# Patient Record
Sex: MALE | Employment: UNEMPLOYED | URBAN - METROPOLITAN AREA
[De-identification: names, ages, dates, MRNs, and addresses within clinical notes are randomized per-mention and may not be internally consistent; named-entity substitution may affect disease eponyms.]

---

## 2017-09-01 ENCOUNTER — OFFICE VISIT (OUTPATIENT)
Dept: URGENT CARE | Facility: URGENT CARE | Age: 22
End: 2017-09-01
Payer: COMMERCIAL

## 2017-09-01 VITALS
OXYGEN SATURATION: 100 % | DIASTOLIC BLOOD PRESSURE: 80 MMHG | HEART RATE: 79 BPM | TEMPERATURE: 98.1 F | SYSTOLIC BLOOD PRESSURE: 106 MMHG | BODY MASS INDEX: 24.81 KG/M2 | WEIGHT: 162 LBS

## 2017-09-01 DIAGNOSIS — H10.33 ACUTE BACTERIAL CONJUNCTIVITIS OF BOTH EYES: Primary | ICD-10-CM

## 2017-09-01 PROCEDURE — 99213 OFFICE O/P EST LOW 20 MIN: CPT | Performed by: FAMILY MEDICINE

## 2017-09-01 RX ORDER — POLYMYXIN B SULFATE AND TRIMETHOPRIM 1; 10000 MG/ML; [USP'U]/ML
1 SOLUTION OPHTHALMIC EVERY 4 HOURS
Qty: 10 ML | Refills: 0 | Status: SHIPPED | OUTPATIENT
Start: 2017-09-01 | End: 2017-09-07

## 2017-09-01 NOTE — NURSING NOTE
"Chief Complaint   Patient presents with     Eye Problem     itchy eyes with some discharge today       Initial /80 (BP Location: Right arm, Patient Position: Chair, Cuff Size: Adult Regular)  Pulse 79  Temp 98.1  F (36.7  C) (Oral)  Wt 162 lb (73.5 kg)  SpO2 100%  BMI 24.81 kg/m2 Estimated body mass index is 24.81 kg/(m^2) as calculated from the following:    Height as of 4/23/16: 5' 7.75\" (1.721 m).    Weight as of this encounter: 162 lb (73.5 kg).  Medication Reconciliation: complete Sonu TENORIO    "

## 2017-09-01 NOTE — MR AVS SNAPSHOT
After Visit Summary   9/1/2017    Sid Hahn    MRN: [id number]           Patient Information     Date Of Birth          1995        Visit Information        Provider Department      9/1/2017 12:30 PM Deepa Moeller MD [facility]        Today's Diagnoses     Acute bacterial conjunctivitis of both eyes    -  1      Care Instructions      What Is Conjunctivitis?    Conjunctivitis is an irritation or infection. It affects the membrane that covers the white of your eye and the inside of your eyelid (conjunctiva). It can happen to one or both eyes. The membrane swells and the blood vessels enlarge (dilate). This makes your eye red. That's why conjunctivitis is sometimes called red eye or pink eye.  What are the symptoms?  If you have one or more of these symptoms, see an eye doctor:    Redness in and around your eye    Eyes that are puffy and sore    Itching, burning, or stinging eyes    Watery eyes or discharge from your eye    Eyelids that are crusty or stuck together when you wake up in the morning    Pink color in the whites of one or both eyes  Getting treatment quickly can help prevent damage to your eyes.  How is it diagnosed?  Conjunctivitis is usually a minor eye infection. But it can sometimes become a more serious problem. Some more serious eye diseases have symptoms that look like conjunctivitis. So it's important for an eye doctor to diagnose you. Your eye doctor will ask about your symptoms and any medicines you take. He or she will ask about any illnesses or medical conditions you may have. The doctor will also check your eyes with a hand-held light and a special microscope called a slit lamp.  Date Last Reviewed: 6/11/2015 2000-2017 [facility]. [address] [place] [address]. All rights reserved. This information is not intended as a substitute for professional medical care. Always follow your healthcare  "professional's instructions.                Follow-ups after your visit        Who to contact     If you have questions or need follow up information about today's clinic visit or your schedule please contact [facility] directly at [phone].  Normal or non-critical lab and imaging results will be communicated to you by Fact[employer], letter or phone within 4 business days after the clinic has received the results. If you do not hear from us within 7 days, please contact the clinic through Factabase[employer] or phone. If you have a critical or abnormal lab result, we will notify you by phone as soon as possible.  Submit refill requests through [employer] or call your pharmacy and they will forward the refill request to us. Please allow 3 business days for your refill to be completed.          Additional Information About Your Visit        [employer] Information     [employer] lets you send messages to your doctor, view your test results, renew your prescriptions, schedule appointments and more. To sign up, go to [email or web address]/[employer] . Click on \"Log in\" on the left side of the screen, which will take you to the Welcome page. Then click on \"Sign up Now\" on the right side of the page.     You will be asked to enter the access code listed below, as well as some personal information. Please follow the directions to create your username and password.     Your access code is: [email or web address]  Expires: 2017 12:55 PM     Your access code will  in 90 days. If you need help or a new code, please call your [place] clinic or [phone].        Care EveryWhere ID     This is your Care EveryWhere ID. This could be used by other organizations to access your [place] medical records  [id number]        Your Vitals Were     Pulse Temperature Pulse Oximetry BMI (Body Mass Index)          79 98.1  F (36.7  C) (Oral) 100% 24.81 kg/m2         Blood Pressure from Last 3 Encounters:   17 106/80   16 120/90 "   12/11/14 122/66    Weight from Last 3 Encounters:   09/01/17 162 lb (73.5 kg)   04/23/16 161 lb (73 kg)   12/11/14 163 lb 6.4 oz (74.1 kg) (66 %)*     * Growth percentiles are based on [facility] 2-20 Years data.              Today, you had the following     No orders found for display         Today's Medication Changes          These changes are accurate as of: 9/1/17 12:55 PM.  If you have any questions, ask your nurse or doctor.               Start taking these medicines.        Dose/Directions    trimethoprim-polymyxin b ophthalmic solution   Commonly known as:  POLYTRIM   Used for:  Acute bacterial conjunctivitis of both eyes   Started by:  Deepa Moeller MD        Dose:  1 drop   Apply 1 drop to eye every 4 hours for 6 days   Quantity:  10 mL   Refills:  0            Where to get your medicines      These medications were sent to [facility] [address] [place] [address] [address] AT [place] [address] [address] [facility] [address]    Hours:  24-hours Phone:  [phone]     trimethoprim-polymyxin b ophthalmic solution                Primary Care Provider Office Phone # Fax #    Melissa Lara [phone]       [address] [address]        Equal Access to Services     DAVID STARKS AH: Hadii aad ku hadasho Soomaali, waaxda luqadaha, qaybta kaalmada adeegyada, mary krueger. So [facility] [phone].    ATENCIÓN: Si habla español, tiene a morley disposición servicios gratuitos de asistencia lingüística. Llame al [phone].    We comply with applicable federal civil rights laws and [place] laws. We do not discriminate on the basis of race, color, national origin, age, disability sex, sexual orientation or gender identity.            Thank you!     Thank you for choosing [facility]  for your care. Our goal is always to provide you with excellent care. Hearing back from our patients is one way  we can continue to improve our services. Please take a few minutes to complete the written survey that you may receive in the mail after your visit with us. Thank you!             Your Updated Medication List - Protect others around you: Learn how to safely use, store and throw away your medicines at www.disposemymeds.org.          This list is accurate as of: 9/1/17 12:55 PM.  Always use your most recent med list.                   Brand Name Dispense Instructions for use Diagnosis    trimethoprim-polymyxin b ophthalmic solution    POLYTRIM    10 mL    Apply 1 drop to eye every 4 hours for 6 days    Acute bacterial conjunctivitis of both eyes

## 2017-09-01 NOTE — LETTER
[facility]  [address] [id number] [phone]      September 1, 2017    RE:  Sid Hahn                                                                                                                                                       [address] [place] [id number]            To whom it may concern:    Sid Hahn is under my professional care for Acute bacterial conjunctivitis of both eyes.       Sincerely,        Deepa Moeller    [facility]

## 2017-09-01 NOTE — PATIENT INSTRUCTIONS
What Is Conjunctivitis?    Conjunctivitis is an irritation or infection. It affects the membrane that covers the white of your eye and the inside of your eyelid (conjunctiva). It can happen to one or both eyes. The membrane swells and the blood vessels enlarge (dilate). This makes your eye red. That's why conjunctivitis is sometimes called red eye or pink eye.  What are the symptoms?  If you have one or more of these symptoms, see an eye doctor:    Redness in and around your eye    Eyes that are puffy and sore    Itching, burning, or stinging eyes    Watery eyes or discharge from your eye    Eyelids that are crusty or stuck together when you wake up in the morning    Pink color in the whites of one or both eyes  Getting treatment quickly can help prevent damage to your eyes.  How is it diagnosed?  Conjunctivitis is usually a minor eye infection. But it can sometimes become a more serious problem. Some more serious eye diseases have symptoms that look like conjunctivitis. So it's important for an eye doctor to diagnose you. Your eye doctor will ask about your symptoms and any medicines you take. He or she will ask about any illnesses or medical conditions you may have. The doctor will also check your eyes with a hand-held light and a special microscope called a slit lamp.  Date Last Reviewed: 6/11/2015 2000-2017 [employer]. [address] [place] [address], [place] [address]. All rights reserved. This information is not intended as a substitute for professional medical care. Always follow your healthcare professional's instructions.

## 2017-12-30 ENCOUNTER — APPOINTMENT (OUTPATIENT)
Dept: GENERAL RADIOLOGY | Facility: CLINIC | Age: 22
End: 2017-12-30
Attending: EMERGENCY MEDICINE
Payer: COMMERCIAL

## 2017-12-30 ENCOUNTER — HOSPITAL ENCOUNTER (EMERGENCY)
Facility: CLINIC | Age: 22
Discharge: HOME | End: 2017-12-30
Attending: EMERGENCY MEDICINE | Admitting: EMERGENCY MEDICINE
Payer: COMMERCIAL

## 2017-12-30 VITALS
SYSTOLIC BLOOD PRESSURE: 127 MMHG | OXYGEN SATURATION: 98 % | BODY MASS INDEX: 24.25 KG/M2 | TEMPERATURE: 99.5 F | DIASTOLIC BLOOD PRESSURE: 64 MMHG | HEIGHT: 68 IN | HEART RATE: 110 BPM | WEIGHT: 160 LBS | RESPIRATION RATE: 18 BRPM

## 2017-12-30 DIAGNOSIS — J10.1 INFLUENZA A: ICD-10-CM

## 2017-12-30 LAB
FLUAV+FLUBV AG SPEC QL: NEGATIVE
FLUAV+FLUBV AG SPEC QL: POSITIVE
SPECIMEN SOURCE: ABNORMAL

## 2017-12-30 PROCEDURE — 71020 XR CHEST 2 VW: CPT

## 2017-12-30 PROCEDURE — 25000132 ZZH RX MED GY IP 250 OP 250 PS 637: Performed by: EMERGENCY MEDICINE

## 2017-12-30 PROCEDURE — 87804 INFLUENZA ASSAY W/OPTIC: CPT | Performed by: EMERGENCY MEDICINE

## 2017-12-30 PROCEDURE — 99284 EMERGENCY DEPT VISIT MOD MDM: CPT | Mod: 25

## 2017-12-30 RX ORDER — ONDANSETRON 4 MG/1
4 TABLET, ORALLY DISINTEGRATING ORAL EVERY 8 HOURS PRN
Qty: 10 TABLET | Refills: 0 | Status: SHIPPED | OUTPATIENT
Start: 2017-12-30 | End: 2018-01-02

## 2017-12-30 RX ORDER — OSELTAMIVIR PHOSPHATE 75 MG/1
75 CAPSULE ORAL ONCE
Status: COMPLETED | OUTPATIENT
Start: 2017-12-30 | End: 2017-12-30

## 2017-12-30 RX ORDER — IBUPROFEN 600 MG/1
600 TABLET, FILM COATED ORAL EVERY 6 HOURS PRN
Qty: 60 TABLET | Refills: 0 | Status: SHIPPED | OUTPATIENT
Start: 2017-12-30

## 2017-12-30 RX ORDER — OSELTAMIVIR PHOSPHATE 75 MG/1
75 CAPSULE ORAL 2 TIMES DAILY
Qty: 10 CAPSULE | Refills: 0 | Status: SHIPPED | OUTPATIENT
Start: 2017-12-30 | End: 2018-01-04

## 2017-12-30 RX ORDER — IBUPROFEN 600 MG/1
600 TABLET, FILM COATED ORAL ONCE
Status: COMPLETED | OUTPATIENT
Start: 2017-12-30 | End: 2017-12-30

## 2017-12-30 RX ADMIN — IBUPROFEN 600 MG: 600 TABLET ORAL at 22:56

## 2017-12-30 RX ADMIN — OSELTAMIVIR PHOSPHATE 75 MG: 75 CAPSULE ORAL at 23:26

## 2017-12-30 ASSESSMENT — ENCOUNTER SYMPTOMS
HEMATURIA: 0
RHINORRHEA: 1
FEVER: 1
MYALGIAS: 0
COUGH: 1
HEADACHES: 1
DYSURIA: 0
FREQUENCY: 0

## 2017-12-30 NOTE — ED AVS SNAPSHOT
Emergency Department    [address] [facility] [address]    Phone:  [phone]    Fax:  [phone]                                       Sid Hahn   MRN: [id number]    Department:   Emergency Department   Date of Visit:  12/30/2017           After Visit Summary Signature Page     I have received my discharge instructions, and my questions have been answered. I have discussed any challenges I see with this plan with the nurse or doctor.    ..........................................................................................................................................  Patient/Patient Representative Signature      ..........................................................................................................................................  Patient Representative Print Name and Relationship to Patient    ..................................................               ................................................  Date                                            Time    ..........................................................................................................................................  Reviewed by Signature/Title    ...................................................              ..............................................  Date                                                            Time

## 2017-12-30 NOTE — ED AVS SNAPSHOT
Emergency Department    [address] [facility] [address]    Phone:  [phone]    Fax:  [phone]                                       Sid Hahn   MRN: [id number]    Department:   Emergency Department   Date of Visit:  12/30/2017           Patient Information     Date Of Birth          1995        Your diagnoses for this visit were:     Influenza A        You were seen by Bhavana Moran MD.      Follow-up Information     Follow up with Melissa Lara MD. Schedule an appointment as soon as possible for a visit in 1 week.    Specialty:  Pediatrics    Why:  As needed    Contact information:    [address] [facility] [id number] [phone]          Discharge Instructions       *No school or work until you have been without a fever for 24 hours without tylenol or motrin.  *Take medications as prescribed.  Ibuprofen and/or tylenol for pain.  Zofran for nausea. Tamiflu. Continue your current medications  *Follow-up with your doctor for a recheck in 2-3 days.    *Return if you develop difficulty breathing, neck stiffness, confusion status changes are unable to keep fluids down, faint or feel like you will faint or become worse in any way.    Discharge Instructions  Influenza    You were diagnosed today with influenza or influenza like illness.  Influenza is a respiratory illness caused by influenza A or B viruses.  Influenza causes fever, headache, muscle aches, and fatigue.  These symptoms start one to four days after you have been around a person with this illness.  People with influenza commonly have a dry cough, sore throat, and a runny nose.   Influenza is spread through sneezing and coughing and can live on surfaces for several days.  It is usually contagious for 5 days but in some cases up to 10 days and often affects several family members. If you have a family member who is less than 2 years old, older than 65 years old, pregnant or has a serious medical  condition, they should be seen right away by a physician to decide if they should take preventative medications.      Return to the Emergency Department if:    You have trouble breathing    You develop pain in your chest    You have signs of being dehydrated, such as dizziness or unable to urinate at least three times daily     You feel confused    You cannot stop vomiting or you cannot drink enough fluids    In children, you should seek help if the child has any of the above or if child:    Has blue or purplish skin color    Is so irritable that he or she does not want to be held    Does not have tears when crying (in infants) or does not urinate at least three times daily    Does not wake up easily    Follow-up with your doctor if you are not improving after 5 days    What can I do to help myself?    Rest    Fluids -- Drink hydrating solutions such as Gatorade or pedialyte as often as you can. If you are drinking enough, you should pass urine at least every eight hours.    Acetaminophen and Ibuprofen (such as Tylenol  or Advil) can relieve fever, headache, and muscle aches. Do not give Aspirin to children under 18 years old.     Antiviral treatment -- Antiviral medicines do not make the flu symptoms go away immediately.  They have only been shown to make the symptoms go away 12 to 24 hours sooner than they would without treatment.       Antibiotics -- Antibiotics are NOT useful for treating viral illnesses such as influenza. Antibiotics should only used if there is a bacterial complication of the flu such as bacterial pneumonia, ear infection, or sinusitis.    Because you were diagnosed with a flu like illness you are very contagious.  This means you cannot work, attend school or  for at least 24 hours or until you no longer have a fever.    Remember that you can always come back to the Emergency Department if you are not able to see your regular doctor in the amount of time listed above, if you get any new  symptoms, or if there is anything that worries you.        24 Hour Appointment Hotline       To make an appointment at any [facility], call [phone] ([phone]). If you don't have a family doctor or clinic, we will help you find one. [place] clinics are conveniently located to serve the needs of you and your family.             Review of your medicines      START taking        Dose / Directions Last dose taken    ibuprofen 600 MG tablet   Commonly known as:  ADVIL/MOTRIN   Dose:  600 mg   Quantity:  60 tablet        Take 1 tablet (600 mg) by mouth every 6 hours as needed for moderate pain   Refills:  0        ondansetron 4 MG ODT tab   Commonly known as:  ZOFRAN ODT   Dose:  4 mg   Quantity:  10 tablet        Take 1 tablet (4 mg) by mouth every 8 hours as needed for nausea   Refills:  0        oseltamivir 75 MG capsule   Commonly known as:  TAMIFLU   Dose:  75 mg   Quantity:  10 capsule        Take 1 capsule (75 mg) by mouth 2 times daily for 5 days   Refills:  0                Prescriptions were sent or printed at these locations (3 Prescriptions)                   Other Prescriptions                Printed at Department/Unit printer (3 of 3)         oseltamivir (TAMIFLU) 75 MG capsule               ibuprofen (ADVIL/MOTRIN) 600 MG tablet               ondansetron (ZOFRAN ODT) 4 MG ODT tab                Procedures and tests performed during your visit     Chest XR,  PA & LAT    Influenza A/B antigen      Orders Needing Specimen Collection     None      Pending Results     Date and Time Order Name Status Description    12/30/2017 2249 Chest XR,  PA & LAT Preliminary             Pending Culture Results     No orders found from 12/28/2017 to 12/31/2017.            Pending Results Instructions     If you had any lab results that were not finalized at the time of your Discharge, you can call the ED Lab Result RN at [phone]. You will be contacted by this team for any positive Lab results or changes  in treatment. The nurses are available 7 days a week from 10A to 6:30P.  You can leave a message 24 hours per day and they will return your call.        Test Results From Your Hospital Stay        12/30/2017 10:58 PM      Component Results     Component Value Ref Range & Units Status    Influenza A/B Agn Specimen Nasal  Final    Influenza A Positive (A) NEG^Negative Final    Influenza B Negative NEG^Negative Final    Test results must be correlated with clinical data. If necessary, results   should be confirmed by a molecular assay or viral culture.           12/30/2017 11:03 PM      Narrative     XR CHEST 2 VW  12/30/2017 10:55 PM      HISTORY: Fever, cough.      COMPARISON: None.    FINDINGS: The heart size is normal. The lungs are clear. No  pneumothorax or pleural effusion.        Impression     IMPRESSION:  No acute abnormality.                Clinical Quality Measure: Blood Pressure Screening     Your blood pressure was checked while you were in the emergency department today. The last reading we obtained was  BP: 147/84 . Please read the guidelines below about what these numbers mean and what you should do about them.  If your systolic blood pressure (the top number) is less than 120 and your diastolic blood pressure (the bottom number) is less than 80, then your blood pressure is normal. There is nothing more that you need to do about it.  If your systolic blood pressure (the top number) is 120-139 or your diastolic blood pressure (the bottom number) is 80-89, your blood pressure may be higher than it should be. You should have your blood pressure rechecked within a year by a primary care provider.  If your systolic blood pressure (the top number) is 140 or greater or your diastolic blood pressure (the bottom number) is 90 or greater, you may have high blood pressure. High blood pressure is treatable, but if left untreated over time it can put you at risk for heart attack, stroke, or kidney failure. You  "should have your blood pressure rechecked by a primary care provider within the next 4 weeks.  If your provider in the emergency department today gave you specific instructions to follow-up with your doctor or provider even sooner than that, you should follow that instruction and not wait for up to 4 weeks for your follow-up visit.        Thank you for choosing [facility]       Thank you for choosing [facility] for your care. Our goal is always to provide you with excellent care. Hearing back from our patients is one way we can continue to improve our services. Please take a few minutes to complete the written survey that you may receive in the mail after you visit with us. Thank you!        [employer] Information     [facility] lets you send messages to your doctor, view your test results, renew your prescriptions, schedule appointments and more. To sign up, go to [email or web address] . Click on \"Log in\" on the left side of the screen, which will take you to the Welcome page. Then click on \"Sign up Now\" on the right side of the page.     You will be asked to enter the access code listed below, as well as some personal information. Please follow the directions to create your username and password.     Your access code is: [email or web address]  Expires: 3/30/2018 11:22 PM     Your access code will  in 90 days. If you need help or a new code, please call your [facility] clinic or [phone].        Care EveryWhere ID     This is your Care EveryWhere ID. This could be used by other organizations to access your [facility] medical records  [id number]        Equal Access to Services     [facility] TEREZA : Hadii king harper hadasho Soscarlettali, waaxda luqadaha, qaybta kaalmada adearturoyada, mary krueger. So [facility] [phone].    ATENCIÓN: Si habla español, tiene a morley disposición servicios gratuitos de asistencia lingüística. Llame al [phone].    We comply with applicable federal civil rights laws and [place] " laws. We do not discriminate on the basis of race, color, national origin, age, disability, sex, sexual orientation, or gender identity.            After Visit Summary       This is your record. Keep this with you and show to your community pharmacist(s) and doctor(s) at your next visit.

## 2017-12-30 NOTE — LETTER
December 30, 2017      To Whom It May Concern:      Sid Hahn was seen in our Emergency Department today, 12/30/17.  He was diagnosed with Influenza A. Please excuse him from work until he has been without a fever for 24 hours off of medications.    Sincerely,        Bhavana Moran MD

## 2017-12-31 NOTE — DISCHARGE INSTRUCTIONS
*No school or work until you have been without a fever for 24 hours without tylenol or motrin.  *Take medications as prescribed.  Ibuprofen and/or tylenol for pain.  Zofran for nausea. Tamiflu. Continue your current medications  *Follow-up with your doctor for a recheck in 2-3 days.    *Return if you develop difficulty breathing, neck stiffness, confusion status changes are unable to keep fluids down, faint or feel like you will faint or become worse in any way.    Discharge Instructions  Influenza    You were diagnosed today with influenza or influenza like illness.  Influenza is a respiratory illness caused by influenza A or B viruses.  Influenza causes fever, headache, muscle aches, and fatigue.  These symptoms start one to four days after you have been around a person with this illness.  People with influenza commonly have a dry cough, sore throat, and a runny nose.   Influenza is spread through sneezing and coughing and can live on surfaces for several days.  It is usually contagious for 5 days but in some cases up to 10 days and often affects several family members. If you have a family member who is less than 2 years old, older than 65 years old, pregnant or has a serious medical condition, they should be seen right away by a physician to decide if they should take preventative medications.      Return to the Emergency Department if:    You have trouble breathing    You develop pain in your chest    You have signs of being dehydrated, such as dizziness or unable to urinate at least three times daily     You feel confused    You cannot stop vomiting or you cannot drink enough fluids    In children, you should seek help if the child has any of the above or if child:    Has blue or purplish skin color    Is so irritable that he or she does not want to be held    Does not have tears when crying (in infants) or does not urinate at least three times daily    Does not wake up easily    Follow-up with your doctor if  you are not improving after 5 days    What can I do to help myself?    Rest    Fluids -- Drink hydrating solutions such as Gatorade or pedialyte as often as you can. If you are drinking enough, you should pass urine at least every eight hours.    Acetaminophen and Ibuprofen (such as Tylenol  or Advil) can relieve fever, headache, and muscle aches. Do not give Aspirin to children under 18 years old.     Antiviral treatment -- Antiviral medicines do not make the flu symptoms go away immediately.  They have only been shown to make the symptoms go away 12 to 24 hours sooner than they would without treatment.       Antibiotics -- Antibiotics are NOT useful for treating viral illnesses such as influenza. Antibiotics should only used if there is a bacterial complication of the flu such as bacterial pneumonia, ear infection, or sinusitis.    Because you were diagnosed with a flu like illness you are very contagious.  This means you cannot work, attend school or  for at least 24 hours or until you no longer have a fever.    Remember that you can always come back to the Emergency Department if you are not able to see your regular doctor in the amount of time listed above, if you get any new symptoms, or if there is anything that worries you.

## 2017-12-31 NOTE — ED PROVIDER NOTES
"  History     Chief Complaint:  Fever    HPI   Sid Hahn is a 22 year old male who presents with a fever. The patient reports that he has experienced intermittent fevers for the past two weeks with associated cough. He reports that he ahd been feeling somewhat better but then again worsened today. He has had a headache and chest pain related to this cough. He reports that he experienced some diarrhea last week, however he has not had this recently. 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 He has been taking ibuprofen for his fever but also reports that he has not recorded temperatures during this time. He denies having had any rashes, body aches, or urinary symptoms.     Allergies:  Minocycline  Penicillins    Medications:    The patient is not currently taking any prescribed medications.     Past Medical History:    Acne  Cannabis abuse, continuous  Dermatophytosis  Elevated LFT's  Epistaxis  Moderate major depression  Onychomycosis  Cannabis abuse    Past Surgical History:    History reviewed. No pertinent past surgical history.     Family History:    The patient denies any relevant family medical history.     Social History:  Smoking Status: Passive exposure  Smokeless Tobacco: No  Alcohol Use: No   Marital Status:  Single [1]    Review of Systems   Constitutional: Positive for fever.   HENT: Positive for rhinorrhea.    Respiratory: Positive for cough.    Genitourinary: Negative for dysuria, frequency, hematuria and urgency.   Musculoskeletal: Negative for myalgias.   Skin: Negative for rash.   Neurological: Positive for headaches.     Physical Exam   Vitals:  Patient Vitals for the past 24 hrs:   BP Temp Temp src Pulse Resp SpO2 Height Weight   12/30/17 2249 - 99.5  F (37.5  C) Oral - - - - -   12/30/17 2248 - - - - - 100 % - -   12/30/17 2211 147/84 98.9  F (37.2  C) - 120 20 99 % 1.727 m (5' 8\") 72.6 kg (160 lb)        Physical Exam  General: Well-nourished, " appears to be uncomfortable when I enter the room,warm to touch  Eyes: PERRL, conjunctivae pink no scleral icterus or conjunctival injection  ENT:  Moist mucus membranes, posterior oropharynx mildly erythematous without exudates or edema. TM clear bilaterally  Respiratory:  Lungs clear to auscultation bilaterally, no crackles/rubs/wheezes.  Good air movement.  Normal work of breathing.  +Cough  CV: Normal rate and rhythm, no murmurs/rubs/gallops  GI:  Abdomen soft and non-distended.  Normoactive BS.  No tenderness, guarding or rebound  Skin: Warm, dry.  No rashes or petechiae  Musculoskeletal: No peripheral edema or calf tenderness  Neuro: Alert and oriented to person/place/time.  Neck supple.  Psychiatric: Normal affect    Emergency Department Course     Imaging:  Radiology findings were communicated with the patient who voiced understanding of the findings.  Chest XR, PA & LAT:  IMPRESSION:  No acute abnormality.  Reading per radiology.     Laboratory:  Laboratory findings were communicated with the patient who voiced understanding of the findings.  Influenza A/B antigen: Influenza A: positive    Interventions:  2256 Advil 600 mg oral    Emergency Department Course:  Nursing notes and vitals reviewed.  I performed an exam of the patient as documented above.   The patient was sent for a XR while in the emergency department, results above.      2306 I rechecked with the patient    I discussed the treatment plan with the patient. They expressed understanding of this plan and consented to discharge. They will be discharged home with instructions for care and follow up. In addition, the patient will return to the emergency department if their symptoms persist, worsen, if new symptoms arise or if there is any concern.  All questions were answered.     I personally reviewed the laboratory results with the patient and answered all related questions prior to discharge.    Impression & Plan      Medical Decision  Making:  Sid Hahn is a 22 year old male who presents for evaluation of cough and fever. This is consistent with an influenza.  The patient is not out of treatment window for influenza and medications ordered as noted above. Chest x ray demonstrates no acute abnormalities. He is at risk for pneumonia but no signs of this are detected on today's visit.  He is not dehydrated and work of breathing is normal. There is no signs of serious bacterial infection such as bacteremia, meningitis, UTI/pyelonephritis, strep pharyngitis, etc.   Close followup of primary care physician is indicated and return to the ED for high fevers > 103 for more than 48 hours more, increasing productive cough, shortness of breath, or confusion.      Diagnosis:    ICD-10-CM    1. Influenza A J10.1       Disposition:   Discharged    CMS Diagnoses: None     Discharge medications:   Sid Hahn   Home Medication Instructions ANGE:[id number]    Printed on:12/30/17 5208   Medication Information                      ibuprofen (ADVIL/MOTRIN) 600 MG tablet  Take 1 tablet (600 mg) by mouth every 6 hours as needed for moderate pain             ondansetron (ZOFRAN ODT) 4 MG ODT tab  Take 1 tablet (4 mg) by mouth every 8 hours as needed for nausea             oseltamivir (TAMIFLU) 75 MG capsule  Take 1 capsule (75 mg) by mouth 2 times daily for 5 days                  Scribe Disclosure:  Ruben BEAULIEU, am serving as a scribe at 10:45 PM on 12/30/2017 to document services personally performed by Bhavana Moran MD, based on my observations and the provider's statements to me.    EMERGENCY DEPARTMENT       Bhavana Moran MD  12/30/17 0510

## 2019-06-27 ENCOUNTER — OFFICE VISIT (OUTPATIENT)
Dept: INTERNAL MEDICINE | Facility: CLINIC | Age: 24
End: 2019-06-27
Payer: COMMERCIAL

## 2019-06-27 VITALS
SYSTOLIC BLOOD PRESSURE: 126 MMHG | OXYGEN SATURATION: 98 % | WEIGHT: 176.9 LBS | BODY MASS INDEX: 27.76 KG/M2 | TEMPERATURE: 98.5 F | RESPIRATION RATE: 16 BRPM | HEIGHT: 67 IN | HEART RATE: 79 BPM | DIASTOLIC BLOOD PRESSURE: 80 MMHG

## 2019-06-27 DIAGNOSIS — Z00.00 ROUTINE HISTORY AND PHYSICAL EXAMINATION OF ADULT: Primary | ICD-10-CM

## 2019-06-27 PROCEDURE — 99395 PREV VISIT EST AGE 18-39: CPT | Performed by: PHYSICIAN ASSISTANT

## 2019-06-27 SDOH — HEALTH STABILITY: MENTAL HEALTH: HOW OFTEN DO YOU HAVE A DRINK CONTAINING ALCOHOL?: MONTHLY OR LESS

## 2019-06-27 SDOH — HEALTH STABILITY: MENTAL HEALTH: HOW MANY STANDARD DRINKS CONTAINING ALCOHOL DO YOU HAVE ON A TYPICAL DAY?: 3 OR 4

## 2019-06-27 ASSESSMENT — MIFFLIN-ST. JEOR: SCORE: 1756.04

## 2019-06-27 NOTE — PROGRESS NOTES
SUBJECTIVE:   CC: Sid Hahn is an 23 year old male who presents for preventative health visit.     Healthy Habits:     Getting at least 3 servings of Calcium per day:  Yes    Bi-annual eye exam:  NO    Dental care twice a year:  Yes    Sleep apnea or symptoms of sleep apnea:  None    Diet:  Regular (no restrictions)    Frequency of exercise:  1 day/week    Duration of exercise:  15-30 minutes    Taking medications regularly:  Yes    Medication side effects:  None    PHQ-2 Total Score: 0    Additional concerns today:  No      Today's PHQ-2 Score:   PHQ-2 ( 1999 [employer]) 6/27/2019   Q1: Little interest or pleasure in doing things 0   Q2: Feeling down, depressed or hopeless 0   PHQ-2 Score 0   Q1: Little interest or pleasure in doing things Not at all   Q2: Feeling down, depressed or hopeless Not at all   PHQ-2 Score 0       Abuse: Current or Past(Physical, Sexual or Emotional)- No  Do you feel safe in your environment? Yes    Social History     Tobacco Use     Smoking status: Passive Smoke Exposure - Never Smoker     Smokeless tobacco: Never Used     Tobacco comment: [facility] house that he is at daily   Substance Use Topics     Alcohol use: Yes     Frequency: Monthly or less     Drinks per session: 3 or 4     Comment: 11/8/2012 kv     If you drink alcohol do you typically have >3 drinks per day or >7 drinks per week? No      Alcohol Use 6/27/2019   Prescreen: >3 drinks/day or >7 drinks/week? No   No flowsheet data found.    Last PSA: No results found for: PSA    Reviewed orders with patient. Reviewed health maintenance and updated orders accordingly - Yes      Reviewed and updated as needed this visit by clinical staff  Tobacco  Allergies  Meds  Problems  Surg Hx  Fam Hx  Soc Hx        Reviewed and updated as needed this visit by Provider  Meds  Problems  Surg Hx  Fam Hx            Review of Systems  CONSTITUTIONAL: NEGATIVE for fever, chills, change in weight  INTEGUMENTARY/SKIN: NEGATIVE for  "worrisome rashes, moles or lesions  EYES: NEGATIVE for vision changes or irritation  ENT: NEGATIVE for ear, mouth and throat problems  RESP: NEGATIVE for significant cough or SOB  CV: NEGATIVE for chest pain, palpitations or peripheral edema  GI: NEGATIVE for nausea, abdominal pain, heartburn, or change in bowel habits   male: negative for dysuria, hematuria, decreased urinary stream, erectile dysfunction, urethral discharge  MUSCULOSKELETAL: NEGATIVE for significant arthralgias or myalgia  NEURO: NEGATIVE for weakness, dizziness or paresthesias  PSYCHIATRIC: NEGATIVE for changes in mood or affect    OBJECTIVE:   /80   Pulse 79   Temp 98.5  F (36.9  C) (Oral)   Resp 16   Ht 1.702 m (5' 7\")   Wt 80.2 kg (176 lb 14.4 oz)   SpO2 98%   BMI 27.71 kg/m      Physical Exam  GENERAL: healthy, alert and no distress  EYES: Eyes grossly normal to inspection, PERRL and conjunctivae and sclerae normal  HENT: ear canals and TM's normal, nose and mouth without ulcers or lesions  NECK: no adenopathy, no asymmetry, masses, or scars and thyroid normal to palpation  RESP: lungs clear to auscultation - no rales, rhonchi or wheezes  CV: regular rate and rhythm, normal S1 S2, no S3 or S4, no murmur, click or rub, no peripheral edema and peripheral pulses strong  ABDOMEN: soft, nontender, no hepatosplenomegaly, no masses and bowel sounds normal  MS: no gross musculoskeletal defects noted, no edema  SKIN: no suspicious lesions or rashes  NEURO: Normal strength and tone, mentation intact and speech normal  PSYCH: mentation appears normal, affect normal/bright    Diagnostic Test Results:  Labs reviewed in Epic    ASSESSMENT/PLAN:   1. Routine history and physical examination of adult  - reviewed [facility] and health maintenance   - completed insurance forms, did not order labs as none were due, would consider ordering if patient needed for insurance, pt to notify clinic if this is the case       Ramya Ramos, " ARVIN  [facility]

## 2019-09-05 ENCOUNTER — OFFICE VISIT (OUTPATIENT)
Dept: URGENT CARE | Facility: URGENT CARE | Age: 24
End: 2019-09-05
Payer: COMMERCIAL

## 2019-09-05 VITALS
SYSTOLIC BLOOD PRESSURE: 122 MMHG | HEART RATE: 79 BPM | HEIGHT: 68 IN | TEMPERATURE: 98.6 F | DIASTOLIC BLOOD PRESSURE: 78 MMHG | BODY MASS INDEX: 26.52 KG/M2 | WEIGHT: 175 LBS | RESPIRATION RATE: 14 BRPM | OXYGEN SATURATION: 97 %

## 2019-09-05 DIAGNOSIS — H10.023 PINK EYE DISEASE OF BOTH EYES: Primary | ICD-10-CM

## 2019-09-05 PROCEDURE — 99213 OFFICE O/P EST LOW 20 MIN: CPT | Performed by: FAMILY MEDICINE

## 2019-09-05 RX ORDER — TOBRAMYCIN 3 MG/ML
2 SOLUTION/ DROPS OPHTHALMIC 4 TIMES DAILY
Qty: 3 ML | Refills: 0 | Status: SHIPPED | OUTPATIENT
Start: 2019-09-05 | End: 2019-09-12

## 2019-09-05 ASSESSMENT — MIFFLIN-ST. JEOR: SCORE: 1763.29

## 2019-09-05 ASSESSMENT — PAIN SCALES - GENERAL: PAINLEVEL: NO PAIN (0)

## 2019-09-05 NOTE — PROGRESS NOTES
"SUBJECTIVE:Chief Complaint:   Chief Complaint   Patient presents with     Urgent Care     Eye Problem     Both eyes, red, itching, burning, puffy x 1wk Woke up tihis AM w/both eyes mattered shut      History of Present Illness: Sid Hahn is a 23 year old male who presents complaining of both eyes mattering and redness for 1-2 days.  Onset/timing: gradual.   Associated Signs and Symptoms: none   Treatment measures tried include: none   Contact wearer : No    Past Medical History:   Diagnosis Date     Acne 8/2/2013     Cannabis abuse, continuous 8/14/2013     Dermatophytosis      Elevated LFTs 11/12/2012     Epistaxis      Moderate major depression (H) 11/8/2012     Onychomycosis 8/14/2013     Polysubstance abuse- cannabis 11/8/2012     Allergies   Allergen Reactions     Minocycline      Elevated LFT's     Penicillins      rash     Social History     Tobacco Use     Smoking status: Passive Smoke Exposure - Never Smoker     Smokeless tobacco: Never Used     Tobacco comment: [employer] [facility] that he is at daily   Substance Use Topics     Alcohol use: Yes     Frequency: Monthly or less     Drinks per session: 3 or 4     Comment: 11/8/2012 kv       ROS:  negative for photophobia, pain, vision change    OBJECTIVE:  /78 (BP Location: Left arm, Patient Position: Sitting, Cuff Size: Adult Regular)   Pulse 79   Temp 98.6  F (37  C) (Oral)   Resp 14   Ht 1.727 m (5' 8\")   Wt 79.4 kg (175 lb)   SpO2 97%   BMI 26.61 kg/m     General: no acute distress  Eye exam: right eye abnormal findings: conjunctivitis with erythema, discharge and matting noted, left eye abnormal findings: conjunctivitis with erythema, discharge and matting noted.  DORIS, EOMI, fundi normal, corneas normal, no foreign bodies, visual acuity normal both eyes, no periorbital cellulitis      ICD-10-CM    1. Pink eye disease of both eyes H10.023 tobramycin (TOBREX) 0.3 % ophthalmic solution           "

## 2019-09-05 NOTE — LETTER
[facility]  [address] [id number] [phone]      September 5, 2019    RE:  Sid Hahn                                                                                                                                                       [address] [place] [id number]            To whom it may concern:    Sid Hahn is under my professional care for Medical. .   He  may return to work with the following: No working or lifting restrictions on or about 9-6-19.          Sincerely,        Froy Zepeda,     [facility]

## 2025-01-07 NOTE — PROGRESS NOTES
SUBJECTIVE:   Chief Complaint   Patient presents with     Eye Problem     itchy eyes with some discharge today     Sid Hahn is a 21 year old male with burning, redness, discharge and mattering in both eyes right greater than left  for 2 days.  No other symptoms.  No significant prior ophthalmological history. No change in visual acuity, no photophobia, no severe eye pain.     Patient does not  use contact lenses.    Past Medical History:   Diagnosis Date     Acne 8/2/2013     Cannabis abuse, continuous 8/14/2013     Dermatophytosis      Elevated LFTs 11/12/2012     Epistaxis      Moderate major depression (H) 11/8/2012     Onychomycosis 8/14/2013     Polysubstance abuse- cannabis 11/8/2012       ALLERGIES:  Minocycline and Penicillins      No current outpatient prescriptions on file prior to visit.  No current facility-administered medications on file prior to visit.     Social History   Substance Use Topics     Smoking status: Passive Smoke Exposure - Never Smoker     Smokeless tobacco: Never Used      Comment: [employer] [facility] that he is at daily     Alcohol use No      Comment: 11/8/2012 kv       No family history on file.      ROS:  CONSTITUTIONAL:NEGATIVE for fever, chills, change in weight  INTEGUMENTARY/SKIN: NEGATIVE for worrisome rashes, moles or lesions  ENT/MOUTH: NEGATIVE for ear, mouth and throat problems  RESP:NEGATIVE for significant cough or SOB  GI: NEGATIVE for nausea, abdominal pain, heartburn, or change in bowel habits    OBJECTIVE:   /80 (BP Location: Right arm, Patient Position: Chair, Cuff Size: Adult Regular)  Pulse 79  Temp 98.1  F (36.7  C) (Oral)  Wt 162 lb (73.5 kg)  SpO2 100%  BMI 24.81 kg/m2    Patient appears well, vitals signs are normal. Eyes: both eyes with findings of typical conjunctivitis noted; erythema and discharge. PERRLA, no foreign body noted. No periorbital cellulitis. The corneas are clear and fundi normal. Visual acuity normal.     Nose:  Mild  Obstructive sleep apnea    Obstructive sleep apnea is a potentially serious sleep disorder. It causes breathing to repeatedly stop and start during sleep.    There are several types of sleep apnea, but the most common is obstructive sleep apnea. This type of apnea occurs when your throat muscles intermittently relax and block your airway during sleep. A noticeable sign of obstructive sleep apnea is snoring.    Treatments for obstructive sleep apnea are available. One treatment involves using a device that uses positive pressure to keep your airway open while you sleep. Another option is a mouthpiece to thrust your lower jaw forward during sleep. In some cases, surgery may be an option too.    Untreated DONNY can cause Stroke, CHF, Hypertension, Sleepiness, Headaches, Depression, Diabetes and Weight gain.              swelling of turbinates bilateral, with clear discharge  Face:  No sinus tenderness  Ears: no erythema, no swelling of the bilateral ear canals.  TM's pearly bilateral  Mouth:  Pharynx, no erythema, no swelling  Neck: no cervical lymphadenopathy    ASSESSMENT:   Acute bacterial conjunctivitis of both eyes      - trimethoprim-polymyxin b (POLYTRIM) ophthalmic solution; Apply 1 drop to eye every 4 hours for 6 days       Antibiotic drops per order. Hygiene discussed- frequent hand washing and to not share towels, washcloths or pillows to prevent transmission within the household.   Call prn.